# Patient Record
Sex: MALE | NOT HISPANIC OR LATINO | Employment: OTHER | URBAN - METROPOLITAN AREA
[De-identification: names, ages, dates, MRNs, and addresses within clinical notes are randomized per-mention and may not be internally consistent; named-entity substitution may affect disease eponyms.]

---

## 2017-02-24 ENCOUNTER — TRANSFERRED RECORDS (OUTPATIENT)
Dept: HEALTH INFORMATION MANAGEMENT | Facility: CLINIC | Age: 77
End: 2017-02-24
Payer: COMMERCIAL

## 2019-05-22 ENCOUNTER — TRANSFERRED RECORDS (OUTPATIENT)
Dept: HEALTH INFORMATION MANAGEMENT | Facility: CLINIC | Age: 79
End: 2019-05-22
Payer: COMMERCIAL

## 2019-10-22 ENCOUNTER — TRANSFERRED RECORDS (OUTPATIENT)
Dept: HEALTH INFORMATION MANAGEMENT | Facility: CLINIC | Age: 79
End: 2019-10-22
Payer: COMMERCIAL

## 2021-05-25 ENCOUNTER — TRANSFERRED RECORDS (OUTPATIENT)
Dept: HEALTH INFORMATION MANAGEMENT | Facility: CLINIC | Age: 81
End: 2021-05-25
Payer: COMMERCIAL

## 2021-09-14 ENCOUNTER — TRANSFERRED RECORDS (OUTPATIENT)
Dept: HEALTH INFORMATION MANAGEMENT | Facility: CLINIC | Age: 81
End: 2021-09-14
Payer: COMMERCIAL

## 2022-04-11 ENCOUNTER — TRANSFERRED RECORDS (OUTPATIENT)
Dept: HEALTH INFORMATION MANAGEMENT | Facility: CLINIC | Age: 82
End: 2022-04-11
Payer: COMMERCIAL

## 2022-04-14 NOTE — TELEPHONE ENCOUNTER
RECORDS RECEIVED FROM: Hallie Baxter   REASON FOR VISIT: cognitive dysfunction,leg pain,weakness   Date of Appt: 08/23/2022   NOTES (FOR ALL VISITS) STATUS DETAILS   OFFICE NOTE from referring provider N/A    OFFICE NOTE from other specialist N/A    DISCHARGE SUMMARY from hospital N/A    DISCHARGE REPORT from the ER N/A    OPERATIVE REPORT N/A    MEDICATION LIST N/A    IMAGING  (FOR ALL VISITS)     EMG N/A    EEG N/A    LUMBAR PUNCTURE N/A    KEKE SCAN N/A    ULTRASOUND (CAROTID BILAT) *VASCULAR* N/A    MRI (HEAD, NECK, SPINE) N/A    CT (HEAD, NECK, SPINE) N/A         Images received from Hallie. Sent to radiology for PACS input  Flor Rodriguez on 5/2/2022 at 10:18 AM

## 2022-05-02 PROBLEM — G20.A1 PARKINSON DISEASE (H): Status: ACTIVE | Noted: 2022-05-02

## 2022-05-02 PROBLEM — H40.9 GLAUCOMA: Status: ACTIVE | Noted: 2022-05-02

## 2022-05-02 PROBLEM — R41.89 COGNITIVE IMPAIRMENT: Status: ACTIVE | Noted: 2022-05-02

## 2022-05-02 PROBLEM — G89.29 CHRONIC HEADACHES: Status: ACTIVE | Noted: 2022-05-02

## 2022-05-02 PROBLEM — Z87.828 HISTORY OF HEAD INJURY: Status: ACTIVE | Noted: 2022-05-02

## 2022-05-02 PROBLEM — I95.1 ORTHOSTATIC HYPOTENSION: Status: ACTIVE | Noted: 2022-05-02

## 2022-05-02 PROBLEM — R73.03 BORDERLINE DIABETES: Status: ACTIVE | Noted: 2022-05-02

## 2022-05-02 PROBLEM — M54.12 CERVICAL RADICULOPATHY: Status: ACTIVE | Noted: 2022-05-02

## 2022-05-02 PROBLEM — M54.17 LUMBOSACRAL RADICULOPATHY AT L5: Status: ACTIVE | Noted: 2022-05-02

## 2022-05-02 PROBLEM — F03.90 DEMENTIA (H): Status: ACTIVE | Noted: 2022-05-02

## 2022-05-02 PROBLEM — R51.9 CHRONIC HEADACHES: Status: ACTIVE | Noted: 2022-05-02

## 2022-05-02 PROBLEM — M21.371 RIGHT FOOT DROP: Status: ACTIVE | Noted: 2022-05-02

## 2022-08-23 ENCOUNTER — PRE VISIT (OUTPATIENT)
Dept: NEUROLOGY | Facility: CLINIC | Age: 82
End: 2022-08-23

## 2022-08-25 ENCOUNTER — TELEPHONE (OUTPATIENT)
Dept: NEUROLOGY | Facility: CLINIC | Age: 82
End: 2022-08-25

## 2022-08-25 NOTE — TELEPHONE ENCOUNTER
Left Voicemail (1st Attempt) for the patient to call back and schedule the following:    Appointment type: New General Neuro  Provider: Dr. Almonte   Return date: 9/6/2022  Specialty phone number: [phone]  Additional appointment(s) needed: None  Additonal Notes: Patient has [employer] Medicare insurance, which is not accepted at [facility] as of 1/1/2021. Writer attempted to reach the patient, may have new insurance, speak with spouse Ronceverte.    Izabela [facility]   Neurology, NeuroSurgery, NeuroPsychology and Pain Management Specialties  [phone]

## 2023-01-27 ENCOUNTER — VIRTUAL VISIT (OUTPATIENT)
Dept: UROLOGY | Facility: CLINIC | Age: 83
End: 2023-01-27
Payer: COMMERCIAL

## 2023-01-27 DIAGNOSIS — I95.1 ORTHOSTATIC HYPOTENSION: ICD-10-CM

## 2023-01-27 DIAGNOSIS — G20.A1 PARKINSON DISEASE (H): ICD-10-CM

## 2023-01-27 DIAGNOSIS — R39.9 LOWER URINARY TRACT SYMPTOMS (LUTS): Primary | ICD-10-CM

## 2023-01-27 PROCEDURE — 99204 OFFICE O/P NEW MOD 45 MIN: CPT | Mod: 95 | Performed by: PHYSICIAN ASSISTANT

## 2023-01-27 RX ORDER — TAMSULOSIN HYDROCHLORIDE 0.4 MG/1
0.8 CAPSULE ORAL
COMMUNITY
Start: 2022-08-09

## 2023-01-27 RX ORDER — MIDODRINE HYDROCHLORIDE 2.5 MG/1
7.5 TABLET ORAL
COMMUNITY
Start: 2022-03-09

## 2023-01-27 RX ORDER — CARBIDOPA AND LEVODOPA 25; 100 MG/1; MG/1
1 TABLET ORAL
COMMUNITY

## 2023-01-27 RX ORDER — BRIMONIDINE TARTRATE 2 MG/ML
SOLUTION/ DROPS OPHTHALMIC
COMMUNITY
Start: 2022-04-25

## 2023-01-27 RX ORDER — CARBIDOPA AND LEVODOPA 50; 200 MG/1; MG/1
1 TABLET, EXTENDED RELEASE ORAL
COMMUNITY

## 2023-01-27 RX ORDER — FLUTICASONE PROPIONATE 50 MCG
2 SPRAY, SUSPENSION (ML) NASAL
COMMUNITY

## 2023-01-27 RX ORDER — FAMOTIDINE 20 MG/1
20 TABLET, FILM COATED ORAL
COMMUNITY
Start: 2022-03-04

## 2023-01-27 RX ORDER — GABAPENTIN 300 MG/1
2 CAPSULE ORAL 3 TIMES DAILY
COMMUNITY
Start: 2022-12-04

## 2023-01-27 RX ORDER — TIMOLOL MALEATE 5 MG/ML
SOLUTION/ DROPS OPHTHALMIC
COMMUNITY
Start: 2022-04-25

## 2023-01-27 RX ORDER — SIMVASTATIN 20 MG
20 TABLET ORAL
COMMUNITY
Start: 2022-12-13

## 2023-01-27 RX ORDER — ACETAMINOPHEN 325 MG/1
650 TABLET ORAL
COMMUNITY

## 2023-01-27 RX ORDER — SERTRALINE HYDROCHLORIDE 100 MG/1
150 TABLET, FILM COATED ORAL
COMMUNITY
Start: 2022-05-03

## 2023-01-27 NOTE — PROGRESS NOTES
Kehinde is a 82 year old who is being evaluated via a billable video visit.      How would you like to obtain your AVS? MyChart  If the video visit is dropped, the invitation should be resent by: Text to cell phone: [phone]  Will anyone else be joining your video visit? No          Name: Kehinde Yun    MRN: [id number]   YOB: 1940                 Chief Complaint:   Urinary frequency         Assessment and Plan:   82 year old male with Parkinson's disease, orthostatic hyptension, glaucoma, CKD 3, diet-controlled DM2, and mixed LUTS including frequency, urgency, urge incontinence, nocturia, weak stream, and feeling of incomplete bladder emptying. He had been taking a high dose of tamsulosin (wife reports 1.2 mg daily) which was recently reduced to 0.8 mg daily due to ongoing orthostatic hypotension. His symptoms sound primarily irritative in nature so we discussed treatment for OAB. Though would need to use caution with anticholinergics given some memory impairment and history of glaucoma. Beta 3 agonists like mirabegron could be a good option for him. Prior to initiating treatment, will check a post-void residual to ensure he is emptying adequately. If PVR low (<100 mL), consider starting mirabegron 25 mg daily +/- reducing tamsulosin to 0.4 mg daily at the same time or a few weeks later. If PVR is elevated, would recommend more formal evaluation with urodynamics. Patient and his wife are amenable.     -Nurse visit for PVR bladder scan next available. Please InBasket me with results.   -Possible trial of mirabegron 25 mg daily +/- reducing tamsulosin to 0.4 mg daily at the same time or a few weeks later. Would then follow up in another 2 months to recheck.      Jessie Chin PA-C  January 27, 2023          History of Present Illness:   Kehinde Yun is an 82 year old male with PMH notable for Parkinson's disease, glaucoma, DM2, CKD 3, GERD, KIRSTEN, and degenerative lumbar disc disease seen today  via virtual visit in consultation from Dr. Brito for evaluation of urinary frequency. History is obtained from the patient and supplemented by chart review as well as his wife who is also present on the video visit today. Kehinde has had lower urinary tract symptoms for years. His primary complaints include nocturia 3-4 times per night, daytime urinary frequency, urgency, urge incontinence, as well as slow stream and feeling of incomplete bladder emptying. He has been taking Flomax with the thought that BPH is likely contributing. His wife reports that he was taking 1.2 mg until recently when his dose was decreased to 0.8 mg daily due to ongoing orthostatic hypotension and syncopal events. He was hospitalized 12/14/22 for orthostatic hypotension and cardiology recommended urology consult to consider decreasing or stopping tamsulosin since this could be contributing to his orthostatic hypotension. He tries to drink a lot of water to keep his blood pressure up which then aggravates urinary frequency.    Kehinde wears incontinence briefs and goes through 1-2 per day. They are quite wet by the end of the day. He denies nocturnal enuresis.     He denies any dysuria, gross hematuria, or recent history of UTI. His stream is weak and he sometimes experiences starting and stopping of his stream.          Past Medical History:     Past Medical History:   Diagnosis Date     Borderline diabetes 5/2/2022     Cervical radiculopathy 5/2/2022     Chronic headaches 5/2/2022     Cognitive impairment 5/2/2022     Dementia (H) 5/2/2022     Glaucoma 5/2/2022     History of head injury 5/2/2022     Lumbosacral radiculopathy at L5 5/2/2022     Orthostatic hypotension 5/2/2022     Parkinson disease (H) 5/2/2022     Right foot drop 5/2/2022            Past Surgical History:     Past Surgical History:   Procedure Laterality Date     BACK SURGERY      lumbar spinal fusion     CHOLECYSTECTOMY       EYE SURGERY      glaucoma            Social  History:     Social History     Tobacco Use     Smoking status: Never     Smokeless tobacco: Never   Substance Use Topics     Alcohol use: Not Currently            Family History:     Family History   Problem Relation Age of Onset     Multiple Sclerosis Brother             Allergies:   No Known Allergies         Medications:     Current Outpatient Medications   Medication Sig     acetaminophen (TYLENOL) 325 MG tablet Take 650 mg by mouth     carbidopa-levodopa (SINEMET CR)  MG CR tablet Take 1 tablet by mouth     carbidopa-levodopa (SINEMET)  MG tablet Take 1 tablet by mouth     famotidine (PEPCID) 20 MG tablet Take 20 mg by mouth     fluticasone (FLONASE) 50 MCG/ACT nasal spray 2 sprays     gabapentin (NEURONTIN) 300 MG capsule Take 2 capsules by mouth 3 times daily     midodrine (PROAMATINE) 2.5 MG tablet Take 7.5 mg by mouth     sertraline (ZOLOFT) 100 MG tablet Take 150 mg by mouth     simvastatin (ZOCOR) 20 MG tablet Take 20 mg by mouth     tamsulosin (FLOMAX) 0.4 MG capsule Take 0.8 mg by mouth     timolol maleate (TIMOPTIC) 0.5 % ophthalmic solution INSTILL 1 DROP BY OPHTHALMIC ROUTE EVERY MORNING INTO BOTH EYES     brimonidine (ALPHAGAN) 0.2 % ophthalmic solution INSTILL 1 DROP BY OPHTHALMIC ROUTE TWICE DAILY INTO BOTH EYES. (Patient not taking: Reported on 1/27/2023)     No current facility-administered medications for this visit.             Review of Systems:    ROS: 14 point ROS neg other than the symptoms noted above in the HPI and PMH.          Physical Exam:   GENERAL: Healthy, alert and no distress  EYES: Eyes grossly normal to inspection.  No discharge or erythema, or obvious scleral/conjunctival abnormalities.  RESP: No audible wheeze, cough, or visible cyanosis.  No visible retractions or increased work of breathing.    SKIN: Visible skin clear. No significant rash, abnormal pigmentation or lesions.  NEURO: Cranial nerves grossly intact.  Mentation and speech appropriate for  age.  PSYCH: Mentation appears normal, affect normal/bright, judgement and insight intact, normal speech and appearance well-groomed.       Labs:    12/14/22 - UA: trace protein, trace ketones, o/w negative  12/14/22 - serum Cr: 1.43      Imaging:    EXAM: CT CHEST ABDOMEN PELVIS W   LOCATION: [facility]   DATE/TIME: 2/22/2022 3:44 PM      FINDINGS:   LUNGS AND PLEURA: There are mild secretions in the lower trachea. Increase in consolidation in the dependent bilateral lower lobes with volume loss. There is mild bibasilar bronchiectasis. There are mild patchy groundglass opacities most prominent in the posterior bilateral upper lobes.     Scant left pleural fluid without kristi effusion. No right pleural effusion or pneumothorax.     MEDIASTINUM/AXILLAE: There is mild dilatation of the aortic root measuring 4.0 cm. Development of multiple mildly prominent in a few mildly enlarged bilateral hilar nodes with reference nodes as follows: Right hilar node 1.3 x 1.7 cm series 2/47. No axillary lymphadenopathy.     CORONARY ARTERY CALCIFICATION: Cannot evaluate.     HEPATOBILIARY: Stable extra hepatic bile or ductal dilatation extending to the level the ampulla. No suspicious focal hepatic parenchymal lesion.     PANCREAS: Moderate fatty atrophy greatest in the tail. There is stable downstream vein pancreatic ductal dilatation measuring up to 6 mm.     SPLEEN: Normal.     ADRENAL GLANDS: Normal.     KIDNEYS/BLADDER: No hydronephrosis. Symmetric enhancement of both kidneys. Decrease in size of exophytic cyst from the lateral left kidney with hairline incomplete wall calcification without thickened septations or nodularity measuring up to 3.8 cm. There is a simple cyst in the superior pole the right kidney that does not require additional follow-up. Urinary bladder is unremarkable apart from a tiny diverticulum at the dome.     BOWEL: Small and large bowel loops are normal in caliber without obstruction. Appendix is  normal. There is liquid colonic stool noted. Scattered colonic diverticula without acute diverticulitis.     LYMPH NODES: No abdominal or pelvic lymphadenopathy.     VASCULATURE: Abdominal aorta normal in caliber with mild aortoiliac/atheromatous plaque.     PELVIC ORGANS: Unremarkable.     MUSCULOSKELETAL: L3-L5 posterior spinal fixation and intervertebral spacer L3-L4 and L4-L5. Left flank generator with thoracic stimulator lead noted.     IMPRESSION:   1.  Mild dependent groundglass and dense opacities most prominent in the bilateral upper lobes suggestive of multifocal infectious/inflammatory pneumonitis with aspiration not excluded.   2.  Increase in partial consolidation of the bilateral lower lobes with volume loss consistent with atelectasis.   3.  Development of mild bilateral hilar lymphadenopathy, likely reactive.   4.  Stable mild extrahepatic and main branch pancreatic ductal dilatation extending to the level the ampulla, not significantly changed since 2016 examination.   5.  Liquid colonic stool can be seen with nonspecific gastroenteritis. No bowel obstruction.        Outside records:    I spent 10 minutes reviewing outside records.         Video-Visit Details    Type of service:  Video Visit   Video Start Time: 1:39 PM  Video End Time:1:54 PM    Originating Location (pt. Location): Home    Distant Location (provider location):  Off-site  Platform used for Video Visit: Yozons       44 minutes spent on the date of the encounter doing chart review, review of outside records, review of test results, interpretation of tests, patient visit, documentation and discussion with family

## 2023-01-27 NOTE — PATIENT INSTRUCTIONS
UROLOGY CLINIC VISIT PATIENT INSTRUCTIONS    Will schedule a nurse visit at [facility] for a post-void bladder scan to ensure you are emptying your bladder. If you are emptying satisfactorily, we can try a medication to treat overactive bladder to help with reducing your urinary frequency, urgency, and incontinence.     If you have any issues, questions or concerns in the meantime, do not hesitate to contact us at [phone] or via 8bit.     It was a pleasure meeting with you today.  Thank you for allowing me and my team the privilege of caring for you today.  YOU are the reason we are here, and I truly hope we provided you with the excellent service you deserve.  Please let us know if there is anything else we can do for you so that we can be sure you are leaving completely satisfied with your care experience.

## 2023-01-30 ENCOUNTER — TELEPHONE (OUTPATIENT)
Dept: UROLOGY | Facility: CLINIC | Age: 83
End: 2023-01-30
Payer: COMMERCIAL

## 2023-01-30 NOTE — TELEPHONE ENCOUNTER
Patient scheduled nurse visit on Thursday Feb. 2nd at 10:30 am for PVR.     Alexandrea Yuan LPN on 1/30/2023 at 11:04 AM

## 2023-02-02 ENCOUNTER — ALLIED HEALTH/NURSE VISIT (OUTPATIENT)
Dept: NURSING | Facility: CLINIC | Age: 83
End: 2023-02-02
Payer: COMMERCIAL

## 2023-02-02 ENCOUNTER — TELEPHONE (OUTPATIENT)
Dept: UROLOGY | Facility: CLINIC | Age: 83
End: 2023-02-02

## 2023-02-02 DIAGNOSIS — R39.9 LOWER URINARY TRACT SYMPTOMS: Primary | ICD-10-CM

## 2023-02-02 DIAGNOSIS — R35.0 URINARY FREQUENCY: ICD-10-CM

## 2023-02-02 DIAGNOSIS — N39.41 URGE INCONTINENCE: ICD-10-CM

## 2023-02-02 DIAGNOSIS — R39.9 LOWER URINARY TRACT SYMPTOMS (LUTS): Primary | ICD-10-CM

## 2023-02-02 DIAGNOSIS — R39.15 URINARY URGENCY: ICD-10-CM

## 2023-02-02 PROCEDURE — 99207 PR NO BILLABLE SERVICE THIS VISIT: CPT

## 2023-02-02 PROCEDURE — 51798 US URINE CAPACITY MEASURE: CPT

## 2023-02-02 RX ORDER — TAMSULOSIN HYDROCHLORIDE 0.4 MG/1
0.4 CAPSULE ORAL DAILY
COMMUNITY
Start: 2023-02-02

## 2023-02-02 RX ORDER — MIRABEGRON 25 MG/1
25 TABLET, FILM COATED, EXTENDED RELEASE ORAL DAILY
Qty: 30 TABLET | Refills: 11 | Status: SHIPPED | OUTPATIENT
Start: 2023-02-02 | End: 2024-02-28

## 2023-02-02 NOTE — TELEPHONE ENCOUNTER
Jessie Chin PA-C Berkenes, Melissa, RN; P [facility] [place]  Thank you!   I had discussed with the patient and his wife that if PVR is low, would start mirabegeron 25 mg daily.   Can you please send a prescription (#30, 11 refills) to his preferred pharmacy?   Would also like him to decrease tamsulosin dose to 0.4 mg daily.   I'd then like to see him back in person in 2 months to recheck.   Thanks!   Jessie Chin PA-C     Received the above message from Jessie Chin PA-C following the 2/2/23 nurse visit for PVR. Called and spoke to patient who gave verbal consent to discuss detailed medical information with spouse Saint John. Informed Saint John of the above information. myrbetriq ordered per Jessie Chin PA-C and sent to [facility] in [employer] in [place] per spouse request. Per [facility] John, they just received a new bottle of the tamsulosin, so they do not need a new prescription. [facility] aware to have patient take flomax 0.4mg once daily. Informed Sarita to call if needing refills of the flomax in the future. Follow up visit scheduled for 4/7/23 at 10:30am.     Marina Amor RN, BSN

## 2023-02-02 NOTE — Clinical Note
ELSA Moseley nurse visit for PVR today and PVR was 16mL. Any new recommendations for patient?  Thank you,  Marina Amor RN, BSN

## 2023-02-02 NOTE — NURSING NOTE
Kehinde Yun comes into clinic today at the request of Jessie Chin PA-C Ordering Provider for PVR for diagnosis of lower urinary tract symptoms.    post void residual: 16mL    Informed patient that a message will be sent to Jessie Chin PA-C to update on the above. Patient aware that he will be contacted with any new recommendations.    This service provided today was under the supervising provider of the day Dr. Khan, who was available if needed.    Marina Amor RN

## 2024-02-11 ENCOUNTER — HEALTH MAINTENANCE LETTER (OUTPATIENT)
Age: 84
End: 2024-02-11

## 2024-02-28 DIAGNOSIS — R39.15 URINARY URGENCY: ICD-10-CM

## 2024-02-28 DIAGNOSIS — R35.0 URINARY FREQUENCY: ICD-10-CM

## 2024-02-28 DIAGNOSIS — N39.41 URGE INCONTINENCE: ICD-10-CM

## 2024-02-28 DIAGNOSIS — R39.9 LOWER URINARY TRACT SYMPTOMS (LUTS): ICD-10-CM

## 2024-03-05 RX ORDER — MIRABEGRON 25 MG/1
25 TABLET, FILM COATED, EXTENDED RELEASE ORAL DAILY
Qty: 30 TABLET | Refills: 0 | Status: SHIPPED | OUTPATIENT
Start: 2024-03-05 | End: 2024-04-05

## 2024-03-05 NOTE — TELEPHONE ENCOUNTER
3/5 Patient scheduled.     [facility]   Dermatology, Surgery, Urology  [facility]

## 2024-04-04 NOTE — PROGRESS NOTES
Name: Kehinde Yun    MRN: [id number]   YOB: 1940                 Chief Complaint:   Urinary frequency         Assessment and Plan:   83 year old male with Parkinson's disease, glaucoma, CKD 3, diet-controlled DM2, and mixed LUTS including frequency, urgency, urge incontinence, nocturia, weak stream, and feeling of incomplete bladder emptying. He has been taking Myrbetriq + tamsulosin for the last year but symptoms remain inadequately controlled. He has also had some labile blood pressures ranging from 60s/40s to 220s/180s requiring multiple ER visits for hypertensive urgency. Some concern that tamsulosin could contribute to low BP while Myrbetriq could contribute to high BP. PVR today is reasonable at 71 mL, so he does not appear to be retaining. Given difficulty managing his symptoms with medications and inability to use certain medications due to other medical comorbidities, we discussed further evaluation with urodynamics +/- cystoscopy and possible third line therapies for OAB pending results of this workup. They would like to proceed.  -Schedule urodynamics with me next available.  -Continue Myrbetriq and tamsulosin for now.  -Next steps pending the above.     Jessie Chin PA-C  April 5, 2024          History of Present Illness:   Kehinde Yun is an 83 year old male with PMH notable for Parkinson's disease, glaucoma, DM2, CKD 3, GERD, KIRSTEN, and degenerative lumbar disc disease seen today for follow up. I saw him in initial consultation (virtually) on 1/27/2023. History from this date reviewed as follows:    History is obtained from the patient and supplemented by chart review as well as his wife who is also present on the video visit today. Kehinde has had lower urinary tract symptoms for years. His primary complaints include nocturia 3-4 times per night, daytime urinary frequency, urgency, urge incontinence, as well as slow stream and feeling of incomplete bladder emptying. He has been  taking Flomax with the thought that BPH is likely contributing. His wife reports that he was taking 1.2 mg until recently when his dose was decreased to 0.8 mg daily due to ongoing orthostatic hypotension and syncopal events. He was hospitalized 12/14/22 for orthostatic hypotension and cardiology recommended urology consult to consider decreasing or stopping tamsulosin since this could be contributing to his orthostatic hypotension. He tries to drink a lot of water to keep his blood pressure up which then aggravates urinary frequency.    Kehinde wears incontinence briefs and goes through 1-2 per day. They are quite wet by the end of the day. He denies nocturnal enuresis.     He denies any dysuria, gross hematuria, or recent history of UTI. His stream is weak and he sometimes experiences starting and stopping of his stream.     PVR on 2/2/2023 was low at 16 mL. He was started on Myrbetriq 25 mg daily and tamsulosin reduced to 0.4 mg daily.     TODAY   4/5/2024:  Kehinde is accompanied by his wife.   He is taking Myrbetriq and tamsulosin. His blood pressures have been quite labile recently. He will have major swings from 60s/40s to 220s/180s requiring multiple ER visits for hypertensive urgency.     Continues to have nocturia 3 times per night, daytime urinary urgency, urge incontinence, and sensation of incomplete bladder emptying and double voiding. He denies dysuria or gross hematuria. He wears incontinence briefs and goes through 1 in 24 hours. They are damp when he changes them. May have some low volume urinary incontinence while asleep, but not entirely sure.          Past Medical History:     Past Medical History:   Diagnosis Date    Borderline diabetes 5/2/2022    Cervical radiculopathy 5/2/2022    Chronic headaches 5/2/2022    Cognitive impairment 5/2/2022    Dementia (H) 5/2/2022    Glaucoma 5/2/2022    History of head injury 5/2/2022    Lumbosacral radiculopathy at L5 5/2/2022    Orthostatic hypotension 5/2/2022     Parkinson disease (H) 5/2/2022    Right foot drop 5/2/2022            Past Surgical History:     Past Surgical History:   Procedure Laterality Date    BACK SURGERY      lumbar spinal fusion    CHOLECYSTECTOMY      EYE SURGERY      glaucoma            Social History:     Social History     Tobacco Use    Smoking status: Never    Smokeless tobacco: Never   Substance Use Topics    Alcohol use: Not Currently            Family History:     Family History   Problem Relation Age of Onset    Multiple Sclerosis Brother             Allergies:   No Known Allergies         Medications:     Current Outpatient Medications   Medication Sig Dispense Refill    acetaminophen (TYLENOL) 325 MG tablet Take 650 mg by mouth      brimonidine (ALPHAGAN) 0.2 % ophthalmic solution INSTILL 1 DROP BY OPHTHALMIC ROUTE TWICE DAILY INTO BOTH EYES. (Patient not taking: Reported on 1/27/2023)      carbidopa-levodopa (SINEMET CR)  MG CR tablet Take 1 tablet by mouth      carbidopa-levodopa (SINEMET)  MG tablet Take 1 tablet by mouth      famotidine (PEPCID) 20 MG tablet Take 20 mg by mouth      fluticasone (FLONASE) 50 MCG/ACT nasal spray 2 sprays      gabapentin (NEURONTIN) 300 MG capsule Take 2 capsules by mouth 3 times daily      midodrine (PROAMATINE) 2.5 MG tablet Take 7.5 mg by mouth      mirabegron (MYRBETRIQ) 25 MG 24 hr tablet Take 1 tablet (25 mg) by mouth daily 30 tablet 0    sertraline (ZOLOFT) 100 MG tablet Take 150 mg by mouth      simvastatin (ZOCOR) 20 MG tablet Take 20 mg by mouth      tamsulosin (FLOMAX) 0.4 MG capsule Take 1 capsule (0.4 mg) by mouth daily      tamsulosin (FLOMAX) 0.4 MG capsule Take 0.8 mg by mouth      timolol maleate (TIMOPTIC) 0.5 % ophthalmic solution INSTILL 1 DROP BY OPHTHALMIC ROUTE EVERY MORNING INTO BOTH EYES       No current facility-administered medications for this visit.            Physical Exam:   There were no vitals taken for this visit.  GENERAL: Healthy, alert and no  distress  EYES: Eyes grossly normal to inspection.  No discharge or erythema, or obvious scleral/conjunctival abnormalities.  RESP: No audible wheeze, cough, or visible cyanosis.  No visible retractions or increased work of breathing.    SKIN: Visible skin clear. No significant rash, abnormal pigmentation or lesions.  NEURO: Cranial nerves grossly intact.  Mentation and speech appropriate for age.  PSYCH: Mentation appears normal, affect normal/bright, judgement and insight intact, normal speech and appearance well-groomed.    PVR: 71 mL by bladder scan (last void 1 hour ago)       Labs:    2/26/24 - UA: negative  2/22/24 - serum Cr: 0.94      Imaging:    EXAM: CT CHEST ABDOMEN PELVIS W   LOCATION: [facility]   DATE/TIME: 2/22/2022 3:44 PM      FINDINGS:   LUNGS AND PLEURA: There are mild secretions in the lower trachea. Increase in consolidation in the dependent bilateral lower lobes with volume loss. There is mild bibasilar bronchiectasis. There are mild patchy groundglass opacities most prominent in the posterior bilateral upper lobes.     Scant left pleural fluid without kristi effusion. No right pleural effusion or pneumothorax.     MEDIASTINUM/AXILLAE: There is mild dilatation of the aortic root measuring 4.0 cm. Development of multiple mildly prominent in a few mildly enlarged bilateral hilar nodes with reference nodes as follows: Right hilar node 1.3 x 1.7 cm series 2/47. No axillary lymphadenopathy.     CORONARY ARTERY CALCIFICATION: Cannot evaluate.     HEPATOBILIARY: Stable extra hepatic bile or ductal dilatation extending to the level the ampulla. No suspicious focal hepatic parenchymal lesion.     PANCREAS: Moderate fatty atrophy greatest in the tail. There is stable downstream vein pancreatic ductal dilatation measuring up to 6 mm.     SPLEEN: Normal.     ADRENAL GLANDS: Normal.     KIDNEYS/BLADDER: No hydronephrosis. Symmetric enhancement of both kidneys. Decrease in size of exophytic cyst from the  lateral left kidney with hairline incomplete wall calcification without thickened septations or nodularity measuring up to 3.8 cm. There is a simple cyst in the superior pole the right kidney that does not require additional follow-up. Urinary bladder is unremarkable apart from a tiny diverticulum at the dome.     BOWEL: Small and large bowel loops are normal in caliber without obstruction. Appendix is normal. There is liquid colonic stool noted. Scattered colonic diverticula without acute diverticulitis.     LYMPH NODES: No abdominal or pelvic lymphadenopathy.     VASCULATURE: Abdominal aorta normal in caliber with mild aortoiliac/atheromatous plaque.     PELVIC ORGANS: Unremarkable.     MUSCULOSKELETAL: L3-L5 posterior spinal fixation and intervertebral spacer L3-L4 and L4-L5. Left flank generator with thoracic stimulator lead noted.     IMPRESSION:   1.  Mild dependent groundglass and dense opacities most prominent in the bilateral upper lobes suggestive of multifocal infectious/inflammatory pneumonitis with aspiration not excluded.   2.  Increase in partial consolidation of the bilateral lower lobes with volume loss consistent with atelectasis.   3.  Development of mild bilateral hilar lymphadenopathy, likely reactive.   4.  Stable mild extrahepatic and main branch pancreatic ductal dilatation extending to the level the ampulla, not significantly changed since 2016 examination.   5.  Liquid colonic stool can be seen with nonspecific gastroenteritis. No bowel obstruction.         30 minutes spent on the date of the encounter doing chart review, review of test results, interpretation of tests, patient visit, and documentation

## 2024-04-05 ENCOUNTER — OFFICE VISIT (OUTPATIENT)
Dept: UROLOGY | Facility: CLINIC | Age: 84
End: 2024-04-05
Payer: COMMERCIAL

## 2024-04-05 DIAGNOSIS — N39.41 URGE INCONTINENCE: ICD-10-CM

## 2024-04-05 DIAGNOSIS — R35.0 URINARY FREQUENCY: ICD-10-CM

## 2024-04-05 DIAGNOSIS — R39.9 LOWER URINARY TRACT SYMPTOMS (LUTS): Primary | ICD-10-CM

## 2024-04-05 DIAGNOSIS — R39.15 URINARY URGENCY: ICD-10-CM

## 2024-04-05 PROCEDURE — 51798 US URINE CAPACITY MEASURE: CPT | Performed by: PHYSICIAN ASSISTANT

## 2024-04-05 PROCEDURE — 99214 OFFICE O/P EST MOD 30 MIN: CPT | Mod: 25 | Performed by: PHYSICIAN ASSISTANT

## 2024-04-05 RX ORDER — MIRABEGRON 25 MG/1
25 TABLET, FILM COATED, EXTENDED RELEASE ORAL DAILY
Qty: 30 TABLET | Refills: 11 | Status: SHIPPED | OUTPATIENT
Start: 2024-04-05

## 2024-04-05 NOTE — NURSING NOTE
Kehinde Yun's goals for this visit include:   Chief Complaint   Patient presents with    RECHECK     LUTS med refill        He requests these members of his care team be copied on today's visit information:       PCP: Jerry Goncalves    Referring Provider:  No referring provider defined for this encounter.    post void residual: 71 ml    Do you need any medication refills at today's visit?     Alexandrea Yuan LPN on 4/5/2024 at 10:22 AM

## 2024-04-05 NOTE — PATIENT INSTRUCTIONS
UROLOGY CLINIC VISIT PATIENT INSTRUCTIONS    Urodynamics with Jessie Chin PA-C next available.    Continue Myrbetriq and tamsulosin (Flomax) for now.     URODYNAMIC TESTING    Where should I go for this test?  The procedure is performed at:     [facility]   [address] [place] [id number]   Floor 4    If you have questions about your test, please call our nurse triage line at [phone], option #2. If you need to cancel or reschedule your test for any reason, please notify us as soon as possible.    Please check in approximately 15 minutes prior to your procedure time.      What is urodynamic testing?   Urodynamic testing refers to a group of tests used to assess bladder function by measuring various aspects of urine storage and emptying. The test takes about 75 minutes. For most patients, the test is not painful.     How should I get ready for this test?  Please try to arrive with a comfortably full bladder if possible because you will be asked to try and urinate prior to your study.  If you received a bladder diary, please complete this prior to your urodynamic test and bring it with you to your appointment. A bladder diary measures how much fluid you are drinking and how often and how much you are urinating. You can also record any urinary leakage that may have occurred and what you were doing when you leaked.    If you have chronic constipation, please take stool softeners for two days before your test.    What happens during the test?  You will first be asked to empty your bladder in a private restroom into a special toilet called a uroflow machine which measures the rate of urine flow.  A nurse will then place a very small tube (called a catheter) into your bladder. This drains any urine left over after urinating and also measures the pressures inside of your bladder during your test. Another small catheter will then be placed into your rectum  to measure abdominal pressures. Two small sticky patches will be placed on the skin near your anus to measure pelvic floor function.   We will then instill contrast dye into your bladder through the bladder catheter. The contrast is very dense and will allow us to take x-ray pictures of your bladder intermittently during your test.  You will be asked to tell us when you first start to feel like your bladder is filling up, when you have moderate urgency to urinate, when you have very strong urgency to urinate and finally when you feel that your bladder is full. Once you feel full you will be asked to try and urinate.    You may be asked to cough or bear down several times during your procedure. The provider running your study will be looking for urine leakage during this time.      What happens after the test?  The provider running your urodynamic study will share the results of your test on the day of your procedure or very soon after.  After your test, you may go about your day as normal. You may notice some blood in your urine for a couple of days which should clear up on its own. You may also feel a more urgent need to use the toilet or you may need to go more often - this is due to having a catheter placed and should resolve on its own in a few days.      If you have any issues, questions or concerns in the meantime, do not hesitate to contact us at [phone] or via August.     It was a pleasure meeting with you today.  Thank you for allowing me and my team the privilege of caring for you today.  YOU are the reason we are here, and I truly hope we provided you with the excellent service you deserve.  Please let us know if there is anything else we can do for you so that we can be sure you are leaving completely satisfied with your care experience.

## 2024-05-10 ENCOUNTER — PRE VISIT (OUTPATIENT)
Dept: UROLOGY | Facility: CLINIC | Age: 84
End: 2024-05-10
Payer: COMMERCIAL

## 2024-05-10 NOTE — TELEPHONE ENCOUNTER
UDS for urinary frequency/urgency/incontinence.  Records available in [facility].

## 2025-03-08 ENCOUNTER — HEALTH MAINTENANCE LETTER (OUTPATIENT)
Age: 85
End: 2025-03-08

## 2025-05-15 ENCOUNTER — TELEPHONE (OUTPATIENT)
Dept: UROLOGY | Facility: CLINIC | Age: 85
End: 2025-05-15
Payer: COMMERCIAL

## 2025-05-15 DIAGNOSIS — R35.0 URINARY FREQUENCY: ICD-10-CM

## 2025-05-15 DIAGNOSIS — R39.15 URINARY URGENCY: ICD-10-CM

## 2025-05-15 DIAGNOSIS — N39.41 URGE INCONTINENCE: ICD-10-CM

## 2025-05-15 DIAGNOSIS — R39.9 LOWER URINARY TRACT SYMPTOMS (LUTS): ICD-10-CM

## 2025-05-15 RX ORDER — MIRABEGRON 25 MG/1
25 TABLET, FILM COATED, EXTENDED RELEASE ORAL DAILY
Qty: 30 TABLET | Refills: 1 | Status: SHIPPED | OUTPATIENT
Start: 2025-05-15

## 2025-05-15 NOTE — TELEPHONE ENCOUNTER
[facility] Call Center    Phone Message    May a detailed message be left on voicemail: yes     Reason for Call: Medication Refill Request    Has the patient contacted the pharmacy for the refill? Yes   Name of medication being requested: mirabegron (MYRBETRIQ) 25 MG 24 hr tablet   Provider who prescribed the medication: Jessie Chin PA-C   Pharmacy: [address] (Ph: [phone])   Date medication is needed: when able, patient is out of medication and is up 3-4 times in the night to void, patient is scheduled with Feliz Hernandez for first available on 6/20/25     Action Taken: Message routed to:  Adult Clinics: Urology p [id number]    Travel Screening: Not Applicable

## 2025-05-15 NOTE — TELEPHONE ENCOUNTER
PA Initiation    Medication: MIRABEGRON ER 25 MG PO TB24  Insurance Company: [employer] - Phone [phone] Fax [phone]  Pharmacy Filling the Rx:    Filling Pharmacy Phone:    Filling Pharmacy Fax:    Start Date: 5/15/2025     EPA IN PROCESS

## 2025-05-15 NOTE — TELEPHONE ENCOUNTER
Prior Authorization Retail Medication Request    Medication/Dose: mirabegron (MYRBETRIQ) 25 MG 24 hr tablet  Diagnosis and ICD code (if different than what is on RX):    New/renewal/insurance change PA/secondary ins. PA:  Previously Tried and Failed:    Rationale:      Insurance   Primary:   Insurance ID:      Secondary (if applicable):  Insurance ID:      Pharmacy Information (if different than what is on RX)  Name:    Phone:    Fax:    Clinic Information  Preferred routing pool for dept communication: [facility]       Marina Amor RN, BSN

## 2025-05-15 NOTE — TELEPHONE ENCOUNTER
Feliz Hernandez PA-C to Me (Selected Message)  JOANN      5/15/25 10:31 AM  With his upcoming visit I am more than happy to do so.  Thank you for checking!    Received the above message from Feliz Hernandez PA-C. Myrbetriq refill approved and ordered per Feliz Hernandez PA-C. Prescription sent to [facility] in [employer] [place]. When ordering, received update that insurance requires a prior authorization. Will send message to PA team to review. See other telephone encounter.    Called and spoke to Sarita who is aware that we received the message and that the request was approved.    Marina Amor RN, BSN

## 2025-05-15 NOTE — TELEPHONE ENCOUNTER
Note and chart below reviewed by writer. Message sent to Feliz Hernandez PA-C with request to review and advise if okay for a one time refill until patient establishes care with him.    Marina Amor RN, BSN

## 2025-05-19 NOTE — TELEPHONE ENCOUNTER
Prior Authorization Approval    Medication: MIRABEGRON ER 25 MG PO TB24  Authorization Effective Date: 5/15/2025  Authorization Expiration Date: 5/15/2026  Approved Dose/Quantity: 30/30  Reference #:     Insurance Company: [employer] - Phone [phone] Fax [phone]  Expected CoPay: $    CoPay Card Available:      Financial Assistance Needed:   Which Pharmacy is filling the prescription:    Pharmacy Notified: Yes  Patient Notified: Yes

## 2025-07-17 ENCOUNTER — LAB REQUISITION (OUTPATIENT)
Dept: LAB | Facility: CLINIC | Age: 85
End: 2025-07-17
Payer: COMMERCIAL

## 2025-07-17 DIAGNOSIS — A15.0 TUBERCULOSIS OF LUNG: ICD-10-CM

## 2025-07-18 PROCEDURE — 86481 TB AG RESPONSE T-CELL SUSP: CPT | Performed by: INTERNAL MEDICINE

## 2025-07-18 PROCEDURE — 36415 COLL VENOUS BLD VENIPUNCTURE: CPT | Performed by: INTERNAL MEDICINE

## 2025-07-18 PROCEDURE — P9603 ONE-WAY ALLOW PRORATED MILES: HCPCS | Performed by: INTERNAL MEDICINE

## 2025-07-19 LAB
GAMMA INTERFERON BACKGROUND BLD IA-ACNC: 0.03 IU/ML
M TB IFN-G BLD-IMP: NEGATIVE
M TB IFN-G CD4+ BCKGRND COR BLD-ACNC: 9.97 IU/ML
MITOGEN IGNF BCKGRD COR BLD-ACNC: -0.01 IU/ML
MITOGEN IGNF BCKGRD COR BLD-ACNC: 0 IU/ML
QUANTIFERON MITOGEN: 10 IU/ML
QUANTIFERON NIL TUBE: 0.03 IU/ML
QUANTIFERON TB1 TUBE: 0.03 IU/ML
QUANTIFERON TB2 TUBE: 0.02

## 2025-07-20 ENCOUNTER — LAB REQUISITION (OUTPATIENT)
Dept: LAB | Facility: CLINIC | Age: 85
End: 2025-07-20
Payer: COMMERCIAL

## 2025-07-20 DIAGNOSIS — I50.9 HEART FAILURE, UNSPECIFIED (H): ICD-10-CM

## 2025-07-21 ENCOUNTER — PRE VISIT (OUTPATIENT)
Dept: UROLOGY | Facility: CLINIC | Age: 85
End: 2025-07-21
Payer: COMMERCIAL

## 2025-07-21 LAB
ANION GAP SERPL CALCULATED.3IONS-SCNC: 13 MMOL/L (ref 7–15)
BUN SERPL-MCNC: 25.4 MG/DL (ref 8–23)
CALCIUM SERPL-MCNC: 9 MG/DL (ref 8.8–10.4)
CHLORIDE SERPL-SCNC: 93 MMOL/L (ref 98–107)
CREAT SERPL-MCNC: 1.16 MG/DL (ref 0.67–1.17)
EGFRCR SERPLBLD CKD-EPI 2021: 62 ML/MIN/1.73M2
ERYTHROCYTE [DISTWIDTH] IN BLOOD BY AUTOMATED COUNT: 13.7 % (ref 10–15)
GLUCOSE SERPL-MCNC: 117 MG/DL (ref 70–99)
HCO3 SERPL-SCNC: 28 MMOL/L (ref 22–29)
HCT VFR BLD AUTO: 37 % (ref 40–53)
HGB BLD-MCNC: 11.4 G/DL (ref 13.3–17.7)
MCH RBC QN AUTO: 29.8 PG (ref 26.5–33)
MCHC RBC AUTO-ENTMCNC: 30.8 G/DL (ref 31.5–36.5)
MCV RBC AUTO: 97 FL (ref 78–100)
PLATELET # BLD AUTO: 318 10E3/UL (ref 150–450)
POTASSIUM SERPL-SCNC: 3.3 MMOL/L (ref 3.4–5.3)
RBC # BLD AUTO: 3.82 10E6/UL (ref 4.4–5.9)
SODIUM SERPL-SCNC: 134 MMOL/L (ref 135–145)
WBC # BLD AUTO: 8.9 10E3/UL (ref 4–11)

## 2025-07-21 PROCEDURE — P9603 ONE-WAY ALLOW PRORATED MILES: HCPCS | Performed by: INTERNAL MEDICINE

## 2025-07-21 PROCEDURE — 80048 BASIC METABOLIC PNL TOTAL CA: CPT | Performed by: INTERNAL MEDICINE

## 2025-07-21 PROCEDURE — 36415 COLL VENOUS BLD VENIPUNCTURE: CPT | Performed by: INTERNAL MEDICINE

## 2025-07-21 PROCEDURE — 85027 COMPLETE CBC AUTOMATED: CPT | Performed by: INTERNAL MEDICINE

## 2025-07-21 NOTE — CONFIDENTIAL NOTE
.Reason for visit: VUDS     Study scheduled because: urinary frequency w/ low volume voids and nocturia w/ urge incontinence.     Study ordered by: Feliz Hernandez PA-C    Relevant information: no showed prev UDS appointment    Records/imaging/labs/orders: all records available    America Khan  7/21/2025  8:28 AM

## 2025-07-22 ENCOUNTER — LAB REQUISITION (OUTPATIENT)
Dept: LAB | Facility: CLINIC | Age: 85
End: 2025-07-22
Payer: COMMERCIAL

## 2025-07-22 DIAGNOSIS — E87.6 HYPOKALEMIA: ICD-10-CM

## 2025-07-23 LAB — POTASSIUM SERPL-SCNC: 3.7 MMOL/L (ref 3.4–5.3)

## 2025-07-23 PROCEDURE — 84132 ASSAY OF SERUM POTASSIUM: CPT

## 2025-07-23 PROCEDURE — 36415 COLL VENOUS BLD VENIPUNCTURE: CPT

## 2025-07-23 PROCEDURE — P9603 ONE-WAY ALLOW PRORATED MILES: HCPCS
